# Patient Record
Sex: FEMALE | Race: WHITE | NOT HISPANIC OR LATINO | Employment: OTHER | URBAN - METROPOLITAN AREA
[De-identification: names, ages, dates, MRNs, and addresses within clinical notes are randomized per-mention and may not be internally consistent; named-entity substitution may affect disease eponyms.]

---

## 2017-01-19 ENCOUNTER — PREPPED CHART (OUTPATIENT)
Dept: URBAN - METROPOLITAN AREA CLINIC 43 | Facility: CLINIC | Age: 62
End: 2017-01-19

## 2018-04-03 NOTE — PATIENT DISCUSSION
We discussed the importance of proper contact lens care and risk of corneal ulcers and blindness with poor compliance and care.

## 2018-04-03 NOTE — PATIENT DISCUSSION
Begin Moxifloxacin Q2h x 24 hrs, then QID x 7 days. F/U 1 week. If any increase in symptoms, or no improvement with medication RTC stat.

## 2018-04-11 NOTE — PATIENT DISCUSSION
Contact lens Rx given - no change from current. Pt reports that she has tried many toric CL in past and is unable to adapt. Pref Sph only. Rec Clear Care Soln going forward. No sleeping in CL.

## 2018-05-02 ENCOUNTER — ESTABLISHED COMPREHENSIVE EXAM (OUTPATIENT)
Dept: URBAN - METROPOLITAN AREA CLINIC 43 | Facility: CLINIC | Age: 63
End: 2018-05-02

## 2018-05-02 DIAGNOSIS — H04.123: ICD-10-CM

## 2018-05-02 DIAGNOSIS — H25.093: ICD-10-CM

## 2018-05-02 PROCEDURE — 92015 DETERMINE REFRACTIVE STATE: CPT

## 2018-05-02 PROCEDURE — 92014 COMPRE OPH EXAM EST PT 1/>: CPT

## 2018-05-02 ASSESSMENT — VISUAL ACUITY
OD_SC: J12
OS_CC: 20/30
OS_SC: 20/20-2
OS_CC: J1
OD_CC: 20/25
OD_CC: J1
OD_SC: 20/20-2
OS_SC: J8

## 2018-05-02 ASSESSMENT — TONOMETRY
OD_IOP_MMHG: 15
OS_IOP_MMHG: 15

## 2019-05-13 ENCOUNTER — ESTABLISHED COMPREHENSIVE EXAM (OUTPATIENT)
Dept: URBAN - METROPOLITAN AREA CLINIC 43 | Facility: CLINIC | Age: 64
End: 2019-05-13

## 2019-05-13 DIAGNOSIS — H25.093: ICD-10-CM

## 2019-05-13 DIAGNOSIS — H02.834: ICD-10-CM

## 2019-05-13 DIAGNOSIS — H02.831: ICD-10-CM

## 2019-05-13 DIAGNOSIS — H04.123: ICD-10-CM

## 2019-05-13 PROCEDURE — 92014 COMPRE OPH EXAM EST PT 1/>: CPT

## 2019-05-13 PROCEDURE — 92015 DETERMINE REFRACTIVE STATE: CPT

## 2019-05-13 ASSESSMENT — VISUAL ACUITY
OS_SC: 20/25+2
OS_SC: J12
OD_CC: 20/25+2
OD_SC: 20/25+2
OS_CC: J1
OS_CC: 20/20-2
OD_CC: J1
OD_SC: J10

## 2019-05-13 ASSESSMENT — TONOMETRY
OD_IOP_MMHG: 16
OS_IOP_MMHG: 16

## 2019-06-20 ENCOUNTER — PREPPED CHART (OUTPATIENT)
Dept: URBAN - METROPOLITAN AREA CLINIC 43 | Facility: CLINIC | Age: 64
End: 2019-06-20

## 2019-06-20 DIAGNOSIS — H02.834: ICD-10-CM

## 2019-06-20 DIAGNOSIS — H04.123: ICD-10-CM

## 2019-06-20 DIAGNOSIS — H02.832: ICD-10-CM

## 2019-06-20 DIAGNOSIS — H02.831: ICD-10-CM

## 2019-06-20 DIAGNOSIS — H02.835: ICD-10-CM

## 2019-06-20 PROCEDURE — 92285 EXTERNAL OCULAR PHOTOGRAPHY: CPT

## 2019-06-20 PROCEDURE — 99203 OFFICE O/P NEW LOW 30 MIN: CPT

## 2019-10-03 ENCOUNTER — TECH ONLY (OUTPATIENT)
Dept: URBAN - METROPOLITAN AREA CLINIC 43 | Facility: CLINIC | Age: 64
End: 2019-10-03

## 2019-10-03 ENCOUNTER — CONSULT (OUTPATIENT)
Dept: URBAN - METROPOLITAN AREA CLINIC 43 | Facility: CLINIC | Age: 64
End: 2019-10-03

## 2019-10-03 DIAGNOSIS — H04.123: ICD-10-CM

## 2019-10-03 DIAGNOSIS — H02.831: ICD-10-CM

## 2019-10-03 DIAGNOSIS — H02.835: ICD-10-CM

## 2019-10-03 DIAGNOSIS — H02.834: ICD-10-CM

## 2019-10-03 DIAGNOSIS — H02.832: ICD-10-CM

## 2019-10-03 PROCEDURE — 99213 OFFICE O/P EST LOW 20 MIN: CPT

## 2019-10-03 PROCEDURE — 92082 INTERMEDIATE VISUAL FIELD XM: CPT

## 2019-10-03 PROCEDURE — 92285 EXTERNAL OCULAR PHOTOGRAPHY: CPT

## 2019-10-03 PROCEDURE — 99211T TECH SERVICE

## 2019-10-03 ASSESSMENT — VISUAL ACUITY
OS_SC: 20/25
OD_SC: 20/20

## 2020-05-15 ENCOUNTER — ESTABLISHED COMPREHENSIVE EXAM (OUTPATIENT)
Dept: URBAN - METROPOLITAN AREA CLINIC 43 | Facility: CLINIC | Age: 65
End: 2020-05-15

## 2020-05-15 DIAGNOSIS — H25.89: ICD-10-CM

## 2020-05-15 DIAGNOSIS — H04.123: ICD-10-CM

## 2020-05-15 DIAGNOSIS — H25.093: ICD-10-CM

## 2020-05-15 DIAGNOSIS — H52.4: ICD-10-CM

## 2020-05-15 PROCEDURE — 92015 DETERMINE REFRACTIVE STATE: CPT

## 2020-05-15 PROCEDURE — 92014 COMPRE OPH EXAM EST PT 1/>: CPT

## 2020-05-15 ASSESSMENT — TONOMETRY
OS_IOP_MMHG: 16
OD_IOP_MMHG: 16

## 2020-05-15 ASSESSMENT — VISUAL ACUITY
OS_SC: 20/25+1
OS_CC: J1
OD_SC: 20/20-2
OD_SC: J12
OD_CC: J1
OS_SC: J12

## 2022-06-14 ENCOUNTER — COMPREHENSIVE EXAM (OUTPATIENT)
Dept: URBAN - METROPOLITAN AREA CLINIC 43 | Facility: CLINIC | Age: 67
End: 2022-06-14

## 2022-06-14 DIAGNOSIS — H25.093: ICD-10-CM

## 2022-06-14 DIAGNOSIS — H04.123: ICD-10-CM

## 2022-06-14 DIAGNOSIS — H25.89: ICD-10-CM

## 2022-06-14 PROCEDURE — 92015 DETERMINE REFRACTIVE STATE: CPT

## 2022-06-14 PROCEDURE — 92014 COMPRE OPH EXAM EST PT 1/>: CPT

## 2022-06-14 ASSESSMENT — VISUAL ACUITY
OS_SC: J10
OS_CC: J1-
OD_CC: J1
OD_SC: 20/25-1
OD_SC: J8-
OS_SC: 20/25-2

## 2022-06-14 ASSESSMENT — TONOMETRY
OS_IOP_MMHG: 14
OD_IOP_MMHG: 14

## 2023-06-20 ENCOUNTER — COMPREHENSIVE EXAM (OUTPATIENT)
Dept: URBAN - METROPOLITAN AREA CLINIC 43 | Facility: CLINIC | Age: 68
End: 2023-06-20

## 2023-06-20 DIAGNOSIS — H25.89: ICD-10-CM

## 2023-06-20 DIAGNOSIS — H04.123: ICD-10-CM

## 2023-06-20 DIAGNOSIS — H25.093: ICD-10-CM

## 2023-06-20 PROCEDURE — 92015 DETERMINE REFRACTIVE STATE: CPT

## 2023-06-20 PROCEDURE — 92014 COMPRE OPH EXAM EST PT 1/>: CPT

## 2023-06-20 ASSESSMENT — VISUAL ACUITY
OS_SC: J12
OS_BAT: 20/20
OS_CC: J1
OS_SC: 20/20
OD_BAT: 20/25-1
OD_CC: J1-
OD_SC: 20/20
OD_SC: J12

## 2023-06-20 ASSESSMENT — TONOMETRY
OS_IOP_MMHG: 15
OD_IOP_MMHG: 15

## 2024-05-21 ENCOUNTER — COMPREHENSIVE EXAM (OUTPATIENT)
Dept: URBAN - METROPOLITAN AREA CLINIC 43 | Facility: CLINIC | Age: 69
End: 2024-05-21

## 2024-05-21 DIAGNOSIS — H04.123: ICD-10-CM

## 2024-05-21 DIAGNOSIS — H25.093: ICD-10-CM

## 2024-05-21 DIAGNOSIS — H25.89: ICD-10-CM

## 2024-05-21 PROCEDURE — 92014 COMPRE OPH EXAM EST PT 1/>: CPT

## 2024-05-21 PROCEDURE — 92015 DETERMINE REFRACTIVE STATE: CPT

## 2024-05-21 ASSESSMENT — TONOMETRY
OD_IOP_MMHG: 13
OS_IOP_MMHG: 13

## 2024-05-21 ASSESSMENT — VISUAL ACUITY
OD_SC: J12
OD_BAT: 20/25-1
OS_SC: J12
OS_CC: J1
OD_SC: 20/25-2
OS_BAT: 20/20
OD_CC: J1
OS_SC: 20/20-1

## 2024-05-29 ENCOUNTER — ESTABLISHED PATIENT (OUTPATIENT)
Dept: URBAN - METROPOLITAN AREA CLINIC 43 | Facility: CLINIC | Age: 69
End: 2024-05-29

## 2024-05-29 DIAGNOSIS — Z79.899: ICD-10-CM

## 2024-05-29 DIAGNOSIS — H25.093: ICD-10-CM

## 2024-05-29 DIAGNOSIS — H04.123: ICD-10-CM

## 2024-05-29 DIAGNOSIS — H25.89: ICD-10-CM

## 2024-05-29 PROCEDURE — 92083 EXTENDED VISUAL FIELD XM: CPT

## 2024-05-29 PROCEDURE — 92012 INTRM OPH EXAM EST PATIENT: CPT

## 2024-05-29 ASSESSMENT — VISUAL ACUITY
OD_SC: 20/25+2
OS_SC: 20/20-2

## 2025-05-29 ENCOUNTER — COMPREHENSIVE EXAM (OUTPATIENT)
Age: 70
End: 2025-05-29

## 2025-05-29 DIAGNOSIS — H25.89: ICD-10-CM

## 2025-05-29 DIAGNOSIS — Z79.899: ICD-10-CM

## 2025-05-29 DIAGNOSIS — H25.813: ICD-10-CM

## 2025-05-29 DIAGNOSIS — H04.123: ICD-10-CM

## 2025-05-29 PROCEDURE — 92014 COMPRE OPH EXAM EST PT 1/>: CPT

## 2025-05-29 PROCEDURE — 92015 DETERMINE REFRACTIVE STATE: CPT

## 2025-05-29 PROCEDURE — 92083 EXTENDED VISUAL FIELD XM: CPT

## 2025-06-26 ENCOUNTER — CONSULTATION/EVALUATION (OUTPATIENT)
Age: 70
End: 2025-06-26

## 2025-06-26 DIAGNOSIS — Z79.899: ICD-10-CM

## 2025-06-26 DIAGNOSIS — H25.813: ICD-10-CM

## 2025-06-26 DIAGNOSIS — H04.123: ICD-10-CM

## 2025-06-26 DIAGNOSIS — H25.89: ICD-10-CM

## 2025-06-26 PROCEDURE — 92136 OPHTHALMIC BIOMETRY: CPT

## 2025-06-26 PROCEDURE — 92014 COMPRE OPH EXAM EST PT 1/>: CPT

## 2025-06-26 PROCEDURE — 92499PMN IMPRIMIS PRED-MOXI-NEPAF 5ML

## 2025-06-26 PROCEDURE — 92025 CPTRIZED CORNEAL TOPOGRAPHY: CPT

## 2025-06-26 PROCEDURE — 92134 CPTRZ OPH DX IMG PST SGM RTA: CPT

## 2025-07-07 ENCOUNTER — PRE-OP/H&P (OUTPATIENT)
Age: 70
End: 2025-07-07

## 2025-07-07 ENCOUNTER — SURGERY/PROCEDURE (OUTPATIENT)
Age: 70
End: 2025-07-07

## 2025-07-07 DIAGNOSIS — H25.813: ICD-10-CM

## 2025-07-07 DIAGNOSIS — H04.123: ICD-10-CM

## 2025-07-07 DIAGNOSIS — H25.89: ICD-10-CM

## 2025-07-07 DIAGNOSIS — Z79.899: ICD-10-CM

## 2025-07-07 PROCEDURE — 66999LNSR LENSAR LASER FOR CAT SX

## 2025-07-07 PROCEDURE — 65772LRI LRI DURING CAT SX

## 2025-07-07 PROCEDURE — 66984LAL REMOVE CATARACT; INSERT LAL LENS

## 2025-07-07 PROCEDURE — 99199PPLAL SPECIAL SERVICE OR REPORT INSERT LAL LENS

## 2025-07-07 PROCEDURE — 99211T TECH SERVICE

## 2025-07-08 ENCOUNTER — SURGERY/PROCEDURE (OUTPATIENT)
Age: 70
End: 2025-07-08

## 2025-07-08 ENCOUNTER — PRE-OP/H&P (OUTPATIENT)
Age: 70
End: 2025-07-08

## 2025-07-08 DIAGNOSIS — Z79.899: ICD-10-CM

## 2025-07-08 DIAGNOSIS — H25.812: ICD-10-CM

## 2025-07-08 DIAGNOSIS — H04.123: ICD-10-CM

## 2025-07-08 DIAGNOSIS — H25.89: ICD-10-CM

## 2025-07-08 DIAGNOSIS — Z96.1: ICD-10-CM

## 2025-07-08 PROCEDURE — 66999LNSR LENSAR LASER FOR CAT SX

## 2025-07-08 PROCEDURE — 66984LAL REMOVE CATARACT; INSERT LAL LENS: Mod: 79,LT

## 2025-07-08 PROCEDURE — 99199PPLAL SPECIAL SERVICE OR REPORT INSERT LAL LENS

## 2025-07-08 PROCEDURE — 99211T TECH SERVICE

## 2025-07-08 PROCEDURE — 65772LRI LRI DURING CAT SX

## 2025-07-09 ENCOUNTER — POST-OP (OUTPATIENT)
Age: 70
End: 2025-07-09

## 2025-07-09 DIAGNOSIS — Z79.899: ICD-10-CM

## 2025-07-09 DIAGNOSIS — Z96.1: ICD-10-CM

## 2025-07-09 DIAGNOSIS — H04.123: ICD-10-CM

## 2025-07-09 DIAGNOSIS — H25.89: ICD-10-CM

## 2025-07-09 PROCEDURE — 99024 POSTOP FOLLOW-UP VISIT: CPT

## 2025-07-21 ENCOUNTER — POST-OP (OUTPATIENT)
Age: 70
End: 2025-07-21

## 2025-07-21 DIAGNOSIS — H25.89: ICD-10-CM

## 2025-07-21 DIAGNOSIS — Z96.1: ICD-10-CM

## 2025-07-31 ENCOUNTER — CLINIC PROCEDURE ONLY (OUTPATIENT)
Age: 70
End: 2025-07-31

## 2025-07-31 DIAGNOSIS — H25.89: ICD-10-CM

## 2025-07-31 DIAGNOSIS — Z96.1: ICD-10-CM

## 2025-07-31 PROCEDURE — 66999LALA LAL ADJUSTMENT

## 2025-08-14 ENCOUNTER — CLINIC PROCEDURE ONLY (OUTPATIENT)
Age: 70
End: 2025-08-14

## 2025-08-14 DIAGNOSIS — H25.89: ICD-10-CM

## 2025-08-14 DIAGNOSIS — Z96.1: ICD-10-CM

## 2025-08-14 PROCEDURE — 66999LALA LAL ADJUSTMENT

## 2025-08-21 ENCOUNTER — CLINIC PROCEDURE ONLY (OUTPATIENT)
Age: 70
End: 2025-08-21

## 2025-08-21 DIAGNOSIS — Z79.899: ICD-10-CM

## 2025-08-21 DIAGNOSIS — H25.89: ICD-10-CM

## 2025-08-21 DIAGNOSIS — Z96.1: ICD-10-CM

## 2025-08-21 PROCEDURE — 66999LALA LAL ADJUSTMENT

## 2025-08-28 ENCOUNTER — CLINIC PROCEDURE ONLY (OUTPATIENT)
Age: 70
End: 2025-08-28

## 2025-08-28 DIAGNOSIS — H04.123: ICD-10-CM

## 2025-08-28 DIAGNOSIS — Z96.1: ICD-10-CM

## 2025-08-28 DIAGNOSIS — H25.89: ICD-10-CM

## 2025-08-28 PROCEDURE — 66999LALA LAL ADJUSTMENT
